# Patient Record
(demographics unavailable — no encounter records)

---

## 2025-07-26 NOTE — DISCUSSION/SUMMARY
[de-identified] : Left wrist pain  HPI Patient is a 15-year-old male accompanied by his mother reports to the office for evaluation of his left wrist pain that is been aggravating him for approximately the past 10 days.  He was playing football when he accidentally fell and landed on outstretched left hand.  He is right-hand dominant.  He went to [facility] radiology where they performed x-rays on 7/16/2025.  They confirmed that he had a buckle fracture of his left wrist.  He has been wearing a left wrist brace for support which has given him some relief.  Lifting, certain range of motion, and palpating certain areas of the wrist aggravate the patient's pain.  Denies any numbness or tingling.  Left wrist x-ray taken in office today revealed a nondisplaced buckle fracture noted at the distal end of the left radius.  Open growth plates noted.  No dislocation.  Otherwise, no other significant abnormalities were seen.  Left hand/wrist exam is as follows: No swelling noted.  No erythema or ecchymosis.  TTP over distal radius, volar and dorsal wrist.  Mild limited range of motion of wrist secondary to pain.  Good active flexion/extension of fingers.  Grasp strength 5/5.  Intact pulse.  Good capillary fill of all fingers.  Light touch intact throughout.  Neurovascular intact.  Assessment/plan Explained fracture on x-ray.  Well-fitting well molded fiberglass short arm cast was applied to left upper extremity.  Advised elevation to help with possible swelling.  Encourage range of motion of elbow and fingers while in cast.  Instructed not to get the cast wet.  Tylenol or Motrin as needed for pain.  Follow-up in 4 weeks for cast off and repeat left wrist x-rays.  All question/concerns were answered in detail 
Yes